# Patient Record
Sex: MALE | URBAN - METROPOLITAN AREA
[De-identification: names, ages, dates, MRNs, and addresses within clinical notes are randomized per-mention and may not be internally consistent; named-entity substitution may affect disease eponyms.]

---

## 2019-11-07 ENCOUNTER — TELEPHONE (OUTPATIENT)
Dept: NEUROSURGERY | Facility: CLINIC | Age: 59
End: 2019-11-07

## 2019-11-07 NOTE — TELEPHONE ENCOUNTER
Provider:  Raúl  Caller: patient  Time of call:  12:43   Phone #:    Surgery:  Lumbar surgery with rods  Surgery Date:  02/2010  Last visit:   Unknown  Next visit: None    ANIBAL:         Reason for call:     Pt called and said he had lumbar surgery with Dr. Olsen in Feb 2010 and hardware was placed in his lumbar region.    He wants to know if he can have a MRI of the brain.